# Patient Record
Sex: MALE | Race: OTHER | HISPANIC OR LATINO | URBAN - METROPOLITAN AREA
[De-identification: names, ages, dates, MRNs, and addresses within clinical notes are randomized per-mention and may not be internally consistent; named-entity substitution may affect disease eponyms.]

---
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
## 2020-07-14 NOTE — ED PEDIATRIC NURSE NOTE - ADDITIONAL COMPLAINTS
Additional Complaints Complex Repair And Split-Thickness Skin Graft Text: The defect edges were debeveled with a #15 scalpel blade.  The primary defect was closed partially with a complex linear closure.  Given the location of the defect, shape of the defect and the proximity to free margins a split thickness skin graft was deemed most appropriate to repair the remaining defect.  The graft was trimmed to fit the size of the remaining defect.  The graft was then placed in the primary defect, oriented appropriately, and sutured into place.

## 2022-03-07 NOTE — ED PROCEDURE NOTE - CPROC ED FOREIGN BODY TYPE1
52y old male overweight with HTN and HLD presents to the ER after being referred from out patient clinic with chest discomfort  Pain has a pleuritic component to it. Family history early cardiac  bug 52y old male overweight with HTN and HLD presents to the ER after being referred from out patient clinic with chest discomfort  Pain has a pleuritic component to it.  Located in left chest region. Not reproducible  Has been having the pain for a few days but today it was worse and he had his wife leave work to come home to bring him to clinic. He had lifted some heavy equipment with brother over one week ago but did not immediately complain of the pain after this event.  Denies any sob, palpitations or syncope  Has a brother dxed with early CAD but he is very overweight and is a smoker  Risk factors for CAD ->  HTN, HLD, family hx   52y old male overweight with HTN and HLD presents to the ER after being referred from out patient clinic with chest discomfort  Pain has a pleuritic component to it.  Located in left chest region. Not reproducible  Has been having the pain for a few days but today it was worse and he had his wife leave work to come home to bring him to clinic. He had lifted some heavy equipment with brother over one week ago but did not immediately complain of the pain after this event.  Denies any sob, palpitations or syncope  Has a brother dxed with early CAD but he is very overweight and is a smoker  Risk factors for CAD ->  HTN, HLD, family hx

## 2022-09-01 NOTE — ED PEDIATRIC NURSE NOTE - TOBACCO USE
Never smoker Wartpeel Counseling:  I discussed with the patient the risks of Wartpeel including but not limited to erythema, scaling, itching, weeping, crusting, and pain.

## 2022-12-14 NOTE — ED PEDIATRIC TRIAGE NOTE - STATUS:
Kaya Etienne  [facility]  OPHTHALM [address]  Scheduled Appointment: 01/06/2023    Lindy Rios  [facility]  INFDISEASE [address]  Scheduled Appointment: 02/02/2023    
[facility]